# Patient Record
Sex: FEMALE | Race: WHITE
[De-identification: names, ages, dates, MRNs, and addresses within clinical notes are randomized per-mention and may not be internally consistent; named-entity substitution may affect disease eponyms.]

---

## 2019-01-13 ENCOUNTER — HOSPITAL ENCOUNTER (EMERGENCY)
Dept: HOSPITAL 90 - EDH | Age: 24
Discharge: HOME | End: 2019-01-13
Payer: COMMERCIAL

## 2019-01-13 DIAGNOSIS — Z72.0: ICD-10-CM

## 2019-01-13 DIAGNOSIS — F43.22: Primary | ICD-10-CM

## 2019-01-13 DIAGNOSIS — J20.9: ICD-10-CM

## 2019-01-13 LAB
AMPHETAMINES UR QL SCN: NEGATIVE
APPEARANCE UR: (no result)
BARBITURATES UR QL SCN: NEGATIVE
BENZODIAZ UR QL SCN: NEGATIVE
BILIRUB UR QL STRIP: (no result)
BZE UR QL SCN: NEGATIVE
CANNABINOIDS UR QL SCN: POSITIVE
COLOR UR: (no result)
DEPRECATED SQUAMOUS URNS QL MICRO: (no result) /HPF (ref 0–2)
GLUCOSE UR STRIP-MCNC: NEGATIVE MG/DL
HCG UR QL: NEGATIVE
HGB UR QL STRIP: (no result)
KETONES UR STRIP-MCNC: 15 MG/DL
LEUKOCYTE ESTERASE UR QL STRIP: (no result)
NITRITE UR QL STRIP: NEGATIVE
OPIATES UR QL SCN: NEGATIVE
PCP UR QL SCN: NEGATIVE
PH UR STRIP: 6.5 [PH] (ref 5–8)
PROT UR QL STRIP: 100
RBC #/AREA URNS HPF: >100 /HPF (ref 0–1)
SP GR UR STRIP: 1.02 (ref 1–1.03)
UROBILINOGEN UR STRIP-MCNC: 1 MG/DL (ref 0.2–1)
WBC #/AREA URNS HPF: (no result) /HPF (ref 0–1)

## 2019-01-13 PROCEDURE — 80305 DRUG TEST PRSMV DIR OPT OBS: CPT

## 2019-01-13 PROCEDURE — 93005 ELECTROCARDIOGRAM TRACING: CPT

## 2019-01-13 PROCEDURE — 81001 URINALYSIS AUTO W/SCOPE: CPT

## 2019-01-13 PROCEDURE — 99284 EMERGENCY DEPT VISIT MOD MDM: CPT

## 2019-01-13 PROCEDURE — 71046 X-RAY EXAM CHEST 2 VIEWS: CPT

## 2019-01-13 PROCEDURE — 96372 THER/PROPH/DIAG INJ SC/IM: CPT

## 2019-01-13 PROCEDURE — 81025 URINE PREGNANCY TEST: CPT

## 2021-04-08 ENCOUNTER — HOSPITAL ENCOUNTER (EMERGENCY)
Dept: HOSPITAL 90 - EDH | Age: 26
Discharge: HOME | End: 2021-04-08
Payer: COMMERCIAL

## 2021-04-08 DIAGNOSIS — Z90.49: ICD-10-CM

## 2021-04-08 DIAGNOSIS — F41.9: ICD-10-CM

## 2021-04-08 DIAGNOSIS — R07.89: Primary | ICD-10-CM

## 2021-04-08 DIAGNOSIS — Z87.891: ICD-10-CM

## 2021-04-08 PROCEDURE — 71045 X-RAY EXAM CHEST 1 VIEW: CPT

## 2022-02-11 ENCOUNTER — HOSPITAL ENCOUNTER (EMERGENCY)
Dept: HOSPITAL 90 - EDH | Age: 27
LOS: 1 days | Discharge: HOME | End: 2022-02-12
Payer: COMMERCIAL

## 2022-02-11 VITALS — WEIGHT: 149.91 LBS | HEIGHT: 64 IN | BODY MASS INDEX: 25.59 KG/M2

## 2022-02-11 VITALS — SYSTOLIC BLOOD PRESSURE: 145 MMHG | DIASTOLIC BLOOD PRESSURE: 72 MMHG

## 2022-02-11 DIAGNOSIS — B35.4: ICD-10-CM

## 2022-02-11 DIAGNOSIS — L02.215: Primary | ICD-10-CM

## 2022-02-11 PROCEDURE — 81025 URINE PREGNANCY TEST: CPT

## 2022-02-11 PROCEDURE — 81003 URINALYSIS AUTO W/O SCOPE: CPT

## 2022-02-12 LAB
HCG UR QL: NEGATIVE
PH UR STRIP: 8 [PH] (ref 5–8)
SP GR UR STRIP: 1 (ref 1–1.03)
UROBILINOGEN UR STRIP-MCNC: 0.2 MG/DL (ref 0.2–1)

## 2023-10-05 ENCOUNTER — HOSPITAL ENCOUNTER (EMERGENCY)
Dept: HOSPITAL 90 - EDH | Age: 28
LOS: 1 days | Discharge: HOME | End: 2023-10-06
Payer: COMMERCIAL

## 2023-10-05 VITALS — BODY MASS INDEX: 28.68 KG/M2 | HEIGHT: 64 IN | WEIGHT: 167.99 LBS

## 2023-10-05 DIAGNOSIS — K59.00: Primary | ICD-10-CM

## 2023-10-05 PROCEDURE — 96361 HYDRATE IV INFUSION ADD-ON: CPT

## 2023-10-05 PROCEDURE — 96375 TX/PRO/DX INJ NEW DRUG ADDON: CPT

## 2023-10-05 PROCEDURE — 84703 CHORIONIC GONADOTROPIN ASSAY: CPT

## 2023-10-05 PROCEDURE — 83605 ASSAY OF LACTIC ACID: CPT

## 2023-10-05 PROCEDURE — 85025 COMPLETE CBC W/AUTO DIFF WBC: CPT

## 2023-10-05 PROCEDURE — 96374 THER/PROPH/DIAG INJ IV PUSH: CPT

## 2023-10-05 PROCEDURE — 80048 BASIC METABOLIC PNL TOTAL CA: CPT

## 2023-10-05 PROCEDURE — 74178 CT ABD&PLV WO CNTR FLWD CNTR: CPT

## 2023-10-05 PROCEDURE — 99284 EMERGENCY DEPT VISIT MOD MDM: CPT

## 2023-10-05 PROCEDURE — 36415 COLL VENOUS BLD VENIPUNCTURE: CPT

## 2023-10-06 VITALS
OXYGEN SATURATION: 97 % | SYSTOLIC BLOOD PRESSURE: 106 MMHG | DIASTOLIC BLOOD PRESSURE: 52 MMHG | HEART RATE: 62 BPM | RESPIRATION RATE: 16 BRPM

## 2023-10-06 LAB
BASOPHILS # BLD AUTO: 0.06 K/UL (ref 0–0.2)
BASOPHILS NFR BLD AUTO: 0.5 % (ref 0–5)
BUN SERPL-MCNC: 9 MG/DL (ref 7–18)
CHLORIDE SERPL-SCNC: 100 MMOL/L (ref 101–111)
CO2 SERPL-SCNC: 30 MMOL/L (ref 21–32)
CREAT SERPL-MCNC: 0.8 MG/DL (ref 0.5–1.5)
EOSINOPHIL # BLD AUTO: 0.06 K/UL (ref 0–0.7)
EOSINOPHIL NFR BLD AUTO: 0.5 % (ref 0–8)
ERYTHROCYTE [DISTWIDTH] IN BLOOD BY AUTOMATED COUNT: 12.6 % (ref 11–15.5)
GFR SERPL CREATININE-BSD FRML MDRD: 104 ML/MIN (ref 90–?)
GLUCOSE SERPL-MCNC: 67 MG/DL (ref 70–105)
HCT VFR BLD AUTO: 43.9 % (ref 36–48)
IMM GRANULOCYTES # BLD: 0.04 K/UL (ref 0–1)
LYMPHOCYTES # SPEC AUTO: 3.7 K/UL (ref 1–4.8)
LYMPHOCYTES NFR SPEC AUTO: 28.7 % (ref 21–51)
MCH RBC QN AUTO: 28.3 PG (ref 27–33)
MCHC RBC AUTO-ENTMCNC: 32.8 G/DL (ref 32–36)
MCV RBC AUTO: 86.4 FL (ref 79–99)
MONOCYTES # BLD AUTO: 0.8 K/UL (ref 0.1–1)
MONOCYTES NFR BLD AUTO: 6.4 % (ref 3–13)
NEUTROPHILS # BLD AUTO: 8.2 K/UL (ref 1.8–7.7)
NEUTROPHILS NFR BLD AUTO: 63.6 % (ref 40–77)
NRBC BLD MANUAL-RTO: 0 % (ref 0–0.19)
PLATELET # BLD AUTO: 276 K/UL (ref 130–400)
POTASSIUM SERPL-SCNC: 3.9 MMOL/L (ref 3.5–5.1)
RBC # BLD AUTO: 5.08 MIL/UL (ref 4–5.5)
SODIUM SERPL-SCNC: 137 MMOL/L (ref 136–145)
WBC # BLD AUTO: 12.9 K/UL (ref 4.8–10.8)

## 2023-10-08 ENCOUNTER — HOSPITAL ENCOUNTER (EMERGENCY)
Dept: HOSPITAL 90 - EDH | Age: 28
Discharge: HOME | End: 2023-10-08
Payer: COMMERCIAL

## 2023-10-08 VITALS — HEIGHT: 64 IN | BODY MASS INDEX: 28.15 KG/M2 | WEIGHT: 164.91 LBS

## 2023-10-08 VITALS
SYSTOLIC BLOOD PRESSURE: 124 MMHG | OXYGEN SATURATION: 98 % | HEART RATE: 55 BPM | RESPIRATION RATE: 16 BRPM | DIASTOLIC BLOOD PRESSURE: 69 MMHG

## 2023-10-08 DIAGNOSIS — Z90.49: ICD-10-CM

## 2023-10-08 DIAGNOSIS — K59.00: ICD-10-CM

## 2023-10-08 DIAGNOSIS — R10.2: Primary | ICD-10-CM

## 2023-10-08 LAB
ALBUMIN SERPL-MCNC: 3.9 G/DL (ref 3.5–5)
ALT SERPL-CCNC: 13 U/L (ref 12–78)
AST SERPL-CCNC: 15 U/L (ref 10–37)
BASOPHILS # BLD AUTO: 0.06 K/UL (ref 0–0.2)
BASOPHILS NFR BLD AUTO: 0.6 % (ref 0–5)
BILIRUB SERPL-MCNC: 0.2 MG/DL (ref 0.2–1)
BUN SERPL-MCNC: 9 MG/DL (ref 7–18)
CHLORIDE SERPL-SCNC: 100 MMOL/L (ref 101–111)
CO2 SERPL-SCNC: 29 MMOL/L (ref 21–32)
CREAT SERPL-MCNC: 0.7 MG/DL (ref 0.5–1.5)
EOSINOPHIL # BLD AUTO: 0.06 K/UL (ref 0–0.7)
EOSINOPHIL NFR BLD AUTO: 0.6 % (ref 0–8)
ERYTHROCYTE [DISTWIDTH] IN BLOOD BY AUTOMATED COUNT: 12.6 % (ref 11–15.5)
GFR SERPL CREATININE-BSD FRML MDRD: 122 ML/MIN (ref 90–?)
GLUCOSE SERPL-MCNC: 87 MG/DL (ref 70–105)
HCT VFR BLD AUTO: 38.9 % (ref 36–48)
IMM GRANULOCYTES # BLD: 0.03 K/UL (ref 0–1)
LYMPHOCYTES # SPEC AUTO: 3 K/UL (ref 1–4.8)
LYMPHOCYTES NFR SPEC AUTO: 29.2 % (ref 21–51)
MCH RBC QN AUTO: 28.2 PG (ref 27–33)
MCHC RBC AUTO-ENTMCNC: 32.6 G/DL (ref 32–36)
MCV RBC AUTO: 86.3 FL (ref 79–99)
MONOCYTES # BLD AUTO: 0.7 K/UL (ref 0.1–1)
MONOCYTES NFR BLD AUTO: 6.8 % (ref 3–13)
NEUTROPHILS # BLD AUTO: 6.4 K/UL (ref 1.8–7.7)
NEUTROPHILS NFR BLD AUTO: 62.5 % (ref 40–77)
NRBC BLD MANUAL-RTO: 0 % (ref 0–0.19)
PLATELET # BLD AUTO: 247 K/UL (ref 130–400)
POTASSIUM SERPL-SCNC: 4.2 MMOL/L (ref 3.5–5.1)
PROT SERPL-MCNC: 7.9 G/DL (ref 6–8.3)
RBC # BLD AUTO: 4.51 MIL/UL (ref 4–5.5)
SODIUM SERPL-SCNC: 136 MMOL/L (ref 136–145)
WBC # BLD AUTO: 10.2 K/UL (ref 4.8–10.8)

## 2023-10-08 PROCEDURE — 96361 HYDRATE IV INFUSION ADD-ON: CPT

## 2023-10-08 PROCEDURE — 36415 COLL VENOUS BLD VENIPUNCTURE: CPT

## 2023-10-08 PROCEDURE — 99284 EMERGENCY DEPT VISIT MOD MDM: CPT

## 2023-10-08 PROCEDURE — 80053 COMPREHEN METABOLIC PANEL: CPT

## 2023-10-08 PROCEDURE — 83605 ASSAY OF LACTIC ACID: CPT

## 2023-10-08 PROCEDURE — 96374 THER/PROPH/DIAG INJ IV PUSH: CPT

## 2023-10-08 PROCEDURE — 96375 TX/PRO/DX INJ NEW DRUG ADDON: CPT

## 2023-10-08 PROCEDURE — 87040 BLOOD CULTURE FOR BACTERIA: CPT

## 2023-10-08 PROCEDURE — 85025 COMPLETE CBC W/AUTO DIFF WBC: CPT

## 2024-05-07 ENCOUNTER — HOSPITAL ENCOUNTER (EMERGENCY)
Dept: HOSPITAL 90 - EDH | Age: 29
Discharge: HOME | End: 2024-05-07
Payer: COMMERCIAL

## 2024-05-07 VITALS
HEART RATE: 78 BPM | RESPIRATION RATE: 18 BRPM | OXYGEN SATURATION: 98 % | SYSTOLIC BLOOD PRESSURE: 127 MMHG | DIASTOLIC BLOOD PRESSURE: 71 MMHG

## 2024-05-07 VITALS — BODY MASS INDEX: 27.46 KG/M2 | HEIGHT: 63 IN | WEIGHT: 154.98 LBS

## 2024-05-07 DIAGNOSIS — S60.011A: Primary | ICD-10-CM

## 2024-05-07 DIAGNOSIS — Z90.49: ICD-10-CM

## 2024-05-07 DIAGNOSIS — Z98.890: ICD-10-CM
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
## 2024-05-07 PROCEDURE — 73140 X-RAY EXAM OF FINGER(S): CPT

## 2024-05-07 RX ADMIN — Medication ONE MG: at 18:41

## 2025-02-12 ENCOUNTER — HOSPITAL ENCOUNTER (EMERGENCY)
Dept: HOSPITAL 90 - EDH | Age: 30
Discharge: HOME | End: 2025-02-12
Payer: SELF-PAY

## 2025-02-12 VITALS — HEIGHT: 64 IN | WEIGHT: 160.06 LBS | BODY MASS INDEX: 27.33 KG/M2

## 2025-02-12 VITALS
DIASTOLIC BLOOD PRESSURE: 85 MMHG | OXYGEN SATURATION: 100 % | TEMPERATURE: 98.1 F | HEART RATE: 74 BPM | SYSTOLIC BLOOD PRESSURE: 131 MMHG | RESPIRATION RATE: 20 BRPM
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
## 2025-02-12 DIAGNOSIS — S00.93XA: Primary | ICD-10-CM

## 2025-02-12 DIAGNOSIS — Z98.890: ICD-10-CM
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
## 2025-02-12 DIAGNOSIS — Z90.49: ICD-10-CM
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
## 2025-02-12 PROCEDURE — 99284 EMERGENCY DEPT VISIT MOD MDM: CPT

## 2025-02-12 PROCEDURE — 70450 CT HEAD/BRAIN W/O DYE: CPT

## 2025-02-12 NOTE — ERN
ED Note


History of Present Illness


Stated Complaint:  HEAD INJURY


Chief Complaint:  Head Injury


Time Seen by MD:  15:49


Time Seen by Midlevel:  15:53


Dictation:


29-YEAR-OLD FEMALE WITH NO PAST MEDICAL HISTORY COMING IN COMPLAINING OF 

HEADACHE AND NAUSEA AFTER HITTING HERSELF WITH A CEILING  FAN LAST NIGHT AT FOUR

IN THE MORNING.  PATIENT STATES SHE WANTS TO BE EVALUATED BECAUSE WHEN SHE WAS A

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  DENIES ANY 

DIZZINESS, VOMITING, FEVERS.


Allergies:  


Coded Allergies:  


     No Known Drug Allergies (Unverified  Allergy, Unknown, 22)


Home Meds


Active Scripts


Naproxen (Naproxen) 375 Mg Tablet.dr, 375 MG PO BID for 7 Days, #14 TAB


   Prov:KALLIE SPRINGER MD         24


Acetaminophen with Codeine (Acetaminophen-Cod #3 Tablet) 300 Mg-30 Mg Tablet, 2 

TAB PO Q6H PRN for PAIN LEVEL 7 TO 10, #40 TAB 0 Refills


   Prov:ERNST MURCIA Sr., MD         10/8/23


Polyethylene Glycol 3350 (Miralax) 17 Gram Powd.pack, 17 GM PO TID for 

constipation, #30 PACKET 2 Refills


   Prov:ERNST MURCIA Sr., MD         10/6/23


Fluconazole (Diflucan) 150 Mg Tablet, 1 TAB PO DAILY, #1 TAB 0 Refills


   Prov:CAITLYN SINGER MD         22


Amoxicillin/Potassium Clav (Amox Tr-K Clv 875-125 mg Tab) 1 Each Tablet, 1 EACH 

PO BID for 7 Days, #14 TAB 0 Refills


   Prov:CAITLYN SINGER MD         22





Past Medical History


Past Medical History:  No Pertinent History


Surgical History:  Appendectomy, Other


Surgical History Other:  CRANIOTOMY


Social History:  Negative, Lives with family, Other


LMP:  Feb 10, 2025





Review of System


Dictation


CONSTITUTIONAL: NEGATIVE FOR FEVER,CHILLS, AND WEIGHT LOSS


EYES: NEGATIVE FOR INJURY, PAIN,REDNESS, AND DISCHARGE


ENT: NEGATIVE FOR INJURY,PAIN OR SWELLING


CARDIOVASCULAR: NEGATIVE FOR CHEST PAIN, PALPITATIONS, AND EDEMA


RESPIRATORY: NEGATIVE FOR SHORTNESS OF BREATH, COUGH, AND WHEEZING, 


ABDOMEN/GI: NEGATIVE FOR ABDOMINAL PAIN, NAUSEA, VOMITING, DIARRHEA, AND 

CONSTIPATION


BACK: NEGATIVE FOR INJURY AND PAIN


: NEGATIVE FOR INJURY, BLEEDING AND DISCHARGE


MS/EXTREMITY: NEGATIVE FOR INJURY AND DEFORMITY


SKIN: NEGATIVE FOR RASH, AND DISCOLORATION


NEURO:  POSITIVE FOR HEADACHE, NO WEAKNESS, NO NUMBNESS, NO TINGLING, AND NO 

SEIZURE 


PSYCH: NEGATIVE FOR SUICIDE IDEATION, HOMICIDAL IDEATION, AND HALLUCINATIONS


Review of Systems:   was completed





Initial Vital Sign


VS





                                   Vital Signs








  Date Time  Temp Pulse Resp B/P (MAP) Pulse Ox O2 Delivery O2 Flow Rate FiO2


 


25 16:02 98.1 75 20 136/89 100 Room Air 0 











Physical Exam


Dictation


GENERAL: AWAKE, ALERT, NAD


HEAD/FACE: NORMOCEPHALIC, ATRAUMATIC


EYES: PERRL, EOMI, VISION AT BASELINE


ENT: ORAL CAVITY CLEAR, TMS CLEAR, NO SIGNS OF INFECTION


NECK: TRACHEA MIDLINE, SUPPLE, NO NUCHAL RIGIDITY


CARDIOVASCULAR: RRR, NORMAL S1/S2, NO MRGS, NO JVD


RESPIRATORY: CTAB, NO RESPIRATORY DISTRESS, NO RALES OR WHEEZES


ABDOMEN: SOFT, NON-TENDER, NON-DISTENDED, NORMAL BOWEL SOUNDS, NO GUARDING OR 

REBOUND.


SKIN: WARM, DRY, NORMAL TURGOR, NO RASH


MS/EXTREMITY: PULSES EQUAL, NO CYANOSIS, NEUROVASCULAR INTACT, FROM


NEURO: COAX4, GCS 15, STRENGTH 5/5, CN 2-12 INTACT, NORMAL CEREBELLAR EXAM, 

NORMAL GAIT,


PSYCH: NORMAL BEHAVIOR, MOOD, AND AFFECT NORMAL





Results (Laboratory/Radiology)


CT Scan Comment:


[place] [address]  


                                           [phone]  


 


                                           IMAGING REPORT  


                                               Signed  


 


PATIENT: FADUMO HYATT                                        MR#: 

[id number]  


ACCOUNT#: [id number]                                            : 

1995  


SEX: F AGE: 29                                                    LOCATION: ED 




ORDER DT: 25 1605                                           STATUS: REG 

 


ACCESSION#: [id number]                                     REPORT#: [id number]

[id number]  


SERVICE DT: 25 1602  


 


REASON: HEADACHE  


ORDERING PHYSICIAN: GEENA LUCIANO NP  


PROCEDURE: HEAD WO  -  CT HEAD/BRAIN W/O CONTRAST  


 


 


CT HEAD WITHOUT CONTRAST  


 


INDICATION: Headache  


 


TECHNIQUE:  Noncontrast axial helical CT images from the vertex through  


the skull base using 5 mm slice thickness without contrast material.  


 


CT was performed with one or more of the following dose reduction  


techniques: Automated exposure control, adjustment of the mA and/or kV  


according to patient size, or use of iterative reconstruction technique.  


 


COMPARISON: None  


 


FINDINGS:  


 


The cerebral and cerebellar hemispheres are age-appropriate in  


appearance.  


 


No evidence for abnormal extra-axial fluid collections or masses.   


 


The ventricles and sulci are normal in size and configuration.   


 


No evidence for intracranial parenchymal, epidural, or subdural  


hemorrhage, mass effect or midline shift.   


 


The gray-white matter differentiation is well preserved.   


 


No secondary evidence to suggest acute ischemia.  


 


The brainstem and cerebellum appear normal.   


 


The visualized orbits appear unremarkable.    


 


The visible paranasal sinuses and mastoid air cells are clear.  


 


The calvarium appears normal.  


 


IMPRESSION:   


 


No acute intracranial process identified.  


 


 


DICTATED BY: HUMAIRA GIBBS MD  


DATE: 25 1749  


 


ELECTRONICALLY SIGNED BY: HUMAIRA GIBBS MD  


DATE: 25 175





ED Course


ED Course





Orders








Procedure Category Date Status





  Time 


 


Acetaminophen 325 Tab PHA 25 Complete





 (Tylenol 325mg Tab  16:02 


 


Ct Head/Brain W/O CT 25 Resulted





Contrast  16:02 








Current Medications








 Medications


  (Trade)  Dose


 Ordered  Sig/Eryn


 Route


 PRN Reason  Start Time


 Stop Time Status Last Admin


Dose Admin


 


 Acetaminophen


  (TYLenol 325MG


 TAB)  650 mg  ONCE  STAT


 PO


   25 16:02


 25 16:11 DC 25 17:06











Vital Signs








  Date Time  Temp Pulse Resp B/P (MAP) Pulse Ox O2 Delivery O2 Flow Rate FiO2


 


25 16:02 98.1 75 20 136/89 100 Room Air 0 











Medical Decision Making


MDM


MDM: 29-YEAR-OLD FEMALE WITH NO PAST MEDICAL HISTORY COMING IN COMPLAINING OF 

HEADACHE AND NAUSEA AFTER HITTING HERSELF WITH A CEILING  FAN LAST NIGHT AT FOUR

IN THE MORNING.  PATIENT STATES SHE WANTS TO BE EVALUATED BECAUSE WHEN SHE WAS A

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  DENIES ANY 

DIZZINESS, VOMITING, FEVERS.  CT SCAN OF THE HEAD IS NEGATIVE.  DISCUSSED 

FINDINGS WITH THE PATIENT.  EDUCATED PATIENT TO TAKE TYLENOL OR MOTRIN 

OVER-THE-COUNTER FOR PAIN CONTROL.  EDUCATED ON RED FLAG SYMPTOMS ON WHEN TO 

RETURN BACK TO THE ER.  PATIENT VERBALIZED UNDERSTANDING, ANSWERED ALL QUESTI

ONS.


DIFFERENTIAL DIAGNOSIS:  ICH, CONCUSSION


RATIONALE: TESTS CONSIDERED AND ORDERED SECONDARY TO SHARED DECISION MAKING 

INCLUDE:


PREVIOUS OUTSIDE RECORDS REVIEWED: OLD ER VISITS.


RISK OF COMPLICATION AND/OR MORBIDITY OR MORTALITY OF PATIENT MANAGEMENT: NONE


MEDICATIONS-PER MEDICATION RECONCILIATION


NEED FOR HOSPITALIZATION: PATIENT DOES NOT MEET CRITERIA FOR HOSPITALIZATION.


NEED FOR EMERGENCY MAJOR/MINOR SURGERY: NO





THERE ARE NO SOCIAL CONCERNS WITH THIS PATIENT.





PRESCRIPTION DRUG MANAGEMENT


PRESCRIPTIONS WILL INCLUDE SYMPTOMATIC CARE





PATIENT'S PRIOR EXTERNAL MEDICAL RECORDS FROM OTHER ER VISITS WERE REVIEWED BY 

ME AS INDICATED.  PRIOR TESTING AND RESULTS FROM PREVIOUS VISITS WERE REVIEWED. 

PRIOR TESTS WERE TAKEN INTO ACCOUNT WITH MEDICAL DECISION MAKING AND RESOURCE 

UTILIZATION, INDEPENDENT HISTORIAN/HISTORIANS WERE USED TO OBTAIN COMPLETE 

MEDICAL HISTORY.





I INDEPENDENTLY INTERPRETED THE TEST THAT WERE PERFORMED, RESULTS WERE REVIEWED 

BY ME AND CONSIDERED FINDINGS ON RADIOLOGY IF ORDERED.





MEDICAL MANAGEMENT AND EXAMINATION INTERPRETATION DISCUSSIONS WERE HAD BY ME 

WITH OTHER QUALIFIED HEALTHCARE PROFESSIONALS AS INDICATED FOR THE PATIENT'S 

CARE.





DX & DISP


Disposition:  Discharge


Departure


Impression:  


   Primary Impression:  Head contusion


Condition:  Stable





Additional Instructions:  


TAKE TYLENOL OR MOTRIN FOR PAIN CONTROL.  FOLLOW UP WITH THE PCP IN 1-2 DAYS.  

RETURN TO THE ER IF YOU HAVE WORSENING HEADACHE, NAUSEA, VOMITING.


Referrals:  


SELF,REFERRAL (PCP)


Time of Disposition:  18:12


I have reviewed the case, and I agree with, Diagnosis and Plan











GEENA LUCIANO NP             2025 18:16

## 2025-02-12 NOTE — HMCIMG
CT HEAD WITHOUT CONTRAST



INDICATION: Headache



TECHNIQUE:  Noncontrast axial helical CT images from the vertex through

the skull base using 5 mm slice thickness without contrast material.



CT was performed with one or more of the following dose reduction

techniques: Automated exposure control, adjustment of the mA and/or kV

according to patient size, or use of iterative reconstruction technique.



COMPARISON: None



FINDINGS:



The cerebral and cerebellar hemispheres are age-appropriate in

appearance.



No evidence for abnormal extra-axial fluid collections or masses. 



The ventricles and sulci are normal in size and configuration. 



No evidence for intracranial parenchymal, epidural, or subdural

hemorrhage, mass effect or midline shift. 



The gray-white matter differentiation is well preserved. 



No secondary evidence to suggest acute ischemia.



The brainstem and cerebellum appear normal. 



The visualized orbits appear unremarkable.  



The visible paranasal sinuses and mastoid air cells are clear.



The calvarium appears normal.



IMPRESSION: 



No acute intracranial process identified.